# Patient Record
Sex: FEMALE | Race: WHITE | Employment: FULL TIME
[De-identification: names, ages, dates, MRNs, and addresses within clinical notes are randomized per-mention and may not be internally consistent; named-entity substitution may affect disease eponyms.]

---

## 2024-06-29 ENCOUNTER — HOSPITAL ENCOUNTER (EMERGENCY)
Facility: HOSPITAL | Age: 29
Discharge: HOME | End: 2024-06-29
Attending: EMERGENCY MEDICINE
Payer: COMMERCIAL

## 2024-06-29 ENCOUNTER — APPOINTMENT (OUTPATIENT)
Facility: HOSPITAL | Age: 29
End: 2024-06-29
Payer: COMMERCIAL

## 2024-06-29 VITALS
TEMPERATURE: 98.7 F | SYSTOLIC BLOOD PRESSURE: 101 MMHG | DIASTOLIC BLOOD PRESSURE: 61 MMHG | HEIGHT: 61 IN | OXYGEN SATURATION: 100 % | WEIGHT: 177.7 LBS | HEART RATE: 108 BPM | BODY MASS INDEX: 33.55 KG/M2 | RESPIRATION RATE: 22 BRPM

## 2024-06-29 DIAGNOSIS — N30.01 ACUTE CYSTITIS WITH HEMATURIA: Primary | ICD-10-CM

## 2024-06-29 LAB
ALBUMIN SERPL-MCNC: 3.6 G/DL (ref 3.5–5)
ALBUMIN/GLOB SERPL: 0.9 (ref 1.1–2.2)
ALP SERPL-CCNC: 72 U/L (ref 45–117)
ALT SERPL-CCNC: 24 U/L (ref 12–78)
ANION GAP SERPL CALC-SCNC: 12 MMOL/L (ref 5–15)
APPEARANCE UR: ABNORMAL
AST SERPL-CCNC: 5 U/L (ref 15–37)
BACTERIA URNS QL MICRO: ABNORMAL /HPF
BILIRUB SERPL-MCNC: 0.7 MG/DL (ref 0.2–1)
BILIRUB UR QL: NEGATIVE
BUN SERPL-MCNC: 7 MG/DL (ref 6–20)
BUN/CREAT SERPL: 6 (ref 12–20)
CALCIUM SERPL-MCNC: 9.4 MG/DL (ref 8.5–10.1)
CHLORIDE SERPL-SCNC: 101 MMOL/L (ref 97–108)
CO2 SERPL-SCNC: 21 MMOL/L (ref 21–32)
COLOR UR: ABNORMAL
COMMENT:: NORMAL
CREAT SERPL-MCNC: 1.24 MG/DL (ref 0.55–1.02)
EKG ATRIAL RATE: 107 BPM
EKG DIAGNOSIS: NORMAL
EKG P AXIS: 50 DEGREES
EKG P-R INTERVAL: 122 MS
EKG Q-T INTERVAL: 328 MS
EKG QRS DURATION: 80 MS
EKG QTC CALCULATION (BAZETT): 437 MS
EKG R AXIS: 41 DEGREES
EKG T AXIS: 39 DEGREES
EKG VENTRICULAR RATE: 107 BPM
EPITH CASTS URNS QL MICRO: ABNORMAL /LPF
ERYTHROCYTE [DISTWIDTH] IN BLOOD BY AUTOMATED COUNT: 12.7 % (ref 11.5–14.5)
GLOBULIN SER CALC-MCNC: 4.2 G/DL (ref 2–4)
GLUCOSE SERPL-MCNC: 128 MG/DL (ref 65–100)
GLUCOSE UR STRIP.AUTO-MCNC: NEGATIVE MG/DL
HCG SERPL QL: NEGATIVE
HCT VFR BLD AUTO: 35.2 % (ref 35–47)
HGB BLD-MCNC: 11.8 G/DL (ref 11.5–16)
HGB UR QL STRIP: ABNORMAL
HYALINE CASTS URNS QL MICRO: ABNORMAL /LPF (ref 0–2)
KETONES UR QL STRIP.AUTO: NEGATIVE MG/DL
LEUKOCYTE ESTERASE UR QL STRIP.AUTO: ABNORMAL
MCH RBC QN AUTO: 27.1 PG (ref 26–34)
MCHC RBC AUTO-ENTMCNC: 33.5 G/DL (ref 30–36.5)
MCV RBC AUTO: 80.9 FL (ref 80–99)
NITRITE UR QL STRIP.AUTO: NEGATIVE
NRBC # BLD: 0 K/UL (ref 0–0.01)
NRBC BLD-RTO: 0 PER 100 WBC
PH UR STRIP: 7.5 (ref 5–8)
PLATELET # BLD AUTO: 349 K/UL (ref 150–400)
PMV BLD AUTO: 9.9 FL (ref 8.9–12.9)
POTASSIUM SERPL-SCNC: 3.5 MMOL/L (ref 3.5–5.1)
PROT SERPL-MCNC: 7.8 G/DL (ref 6.4–8.2)
PROT UR STRIP-MCNC: 100 MG/DL
RBC # BLD AUTO: 4.35 M/UL (ref 3.8–5.2)
RBC #/AREA URNS HPF: ABNORMAL /HPF (ref 0–5)
SODIUM SERPL-SCNC: 134 MMOL/L (ref 136–145)
SP GR UR REFRACTOMETRY: 1.01 (ref 1–1.03)
SPECIMEN HOLD: NORMAL
TROPONIN I SERPL HS-MCNC: <4 NG/L (ref 0–51)
UROBILINOGEN UR QL STRIP.AUTO: 0.2 EU/DL (ref 0.2–1)
WBC # BLD AUTO: 17.1 K/UL (ref 3.6–11)
WBC URNS QL MICRO: >100 /HPF (ref 0–4)

## 2024-06-29 PROCEDURE — 93010 ELECTROCARDIOGRAM REPORT: CPT | Performed by: INTERNAL MEDICINE

## 2024-06-29 PROCEDURE — 85027 COMPLETE CBC AUTOMATED: CPT

## 2024-06-29 PROCEDURE — 6360000002 HC RX W HCPCS: Performed by: EMERGENCY MEDICINE

## 2024-06-29 PROCEDURE — 84484 ASSAY OF TROPONIN QUANT: CPT

## 2024-06-29 PROCEDURE — 96374 THER/PROPH/DIAG INJ IV PUSH: CPT

## 2024-06-29 PROCEDURE — 84703 CHORIONIC GONADOTROPIN ASSAY: CPT

## 2024-06-29 PROCEDURE — 81001 URINALYSIS AUTO W/SCOPE: CPT

## 2024-06-29 PROCEDURE — 80053 COMPREHEN METABOLIC PANEL: CPT

## 2024-06-29 PROCEDURE — 2580000003 HC RX 258: Performed by: EMERGENCY MEDICINE

## 2024-06-29 PROCEDURE — 71046 X-RAY EXAM CHEST 2 VIEWS: CPT

## 2024-06-29 PROCEDURE — 36415 COLL VENOUS BLD VENIPUNCTURE: CPT

## 2024-06-29 PROCEDURE — 96361 HYDRATE IV INFUSION ADD-ON: CPT

## 2024-06-29 PROCEDURE — 99285 EMERGENCY DEPT VISIT HI MDM: CPT

## 2024-06-29 PROCEDURE — 93005 ELECTROCARDIOGRAM TRACING: CPT | Performed by: EMERGENCY MEDICINE

## 2024-06-29 RX ORDER — 0.9 % SODIUM CHLORIDE 0.9 %
1000 INTRAVENOUS SOLUTION INTRAVENOUS ONCE
Status: COMPLETED | OUTPATIENT
Start: 2024-06-29 | End: 2024-06-29

## 2024-06-29 RX ORDER — NITROFURANTOIN 25; 75 MG/1; MG/1
100 CAPSULE ORAL 2 TIMES DAILY
Qty: 5 CAPSULE | Refills: 0 | Status: ON HOLD | OUTPATIENT
Start: 2024-06-29 | End: 2024-07-04 | Stop reason: HOSPADM

## 2024-06-29 RX ORDER — KETOROLAC TROMETHAMINE 30 MG/ML
30 INJECTION, SOLUTION INTRAMUSCULAR; INTRAVENOUS ONCE
Status: COMPLETED | OUTPATIENT
Start: 2024-06-29 | End: 2024-06-29

## 2024-06-29 RX ADMIN — SODIUM CHLORIDE 1000 ML: 9 INJECTION, SOLUTION INTRAVENOUS at 02:16

## 2024-06-29 RX ADMIN — SODIUM CHLORIDE 1000 ML: 9 INJECTION, SOLUTION INTRAVENOUS at 04:00

## 2024-06-29 RX ADMIN — KETOROLAC TROMETHAMINE 30 MG: 30 INJECTION, SOLUTION INTRAMUSCULAR at 04:05

## 2024-06-29 ASSESSMENT — ENCOUNTER SYMPTOMS
SHORTNESS OF BREATH: 1
NAUSEA: 0
WHEEZING: 0
ABDOMINAL PAIN: 0
COUGH: 0
VOMITING: 0

## 2024-06-29 ASSESSMENT — PAIN SCALES - GENERAL
PAINLEVEL_OUTOF10: 4
PAINLEVEL_OUTOF10: 5

## 2024-06-29 ASSESSMENT — PAIN DESCRIPTION - ORIENTATION: ORIENTATION: LOWER

## 2024-06-29 ASSESSMENT — PAIN - FUNCTIONAL ASSESSMENT
PAIN_FUNCTIONAL_ASSESSMENT: 0-10
PAIN_FUNCTIONAL_ASSESSMENT: ACTIVITIES ARE NOT PREVENTED
PAIN_FUNCTIONAL_ASSESSMENT: ACTIVITIES ARE NOT PREVENTED

## 2024-06-29 ASSESSMENT — PAIN DESCRIPTION - DESCRIPTORS
DESCRIPTORS: ACHING
DESCRIPTORS: ACHING

## 2024-06-29 ASSESSMENT — PAIN DESCRIPTION - PAIN TYPE: TYPE: ACUTE PAIN

## 2024-06-29 ASSESSMENT — PAIN DESCRIPTION - LOCATION
LOCATION: OTHER (COMMENT)
LOCATION: BACK

## 2024-06-29 NOTE — ED PROVIDER NOTES
[facility] EMERGENCY DEPT  EMERGENCY DEPARTMENT ENCOUNTER      Pt Name: Rosey Hardy  MRN: [id number]  Birthdate 1995  Date of evaluation: 6/29/2024  Provider: Sara Joe MD    CHIEF COMPLAINT       Chief Complaint   Patient presents with    Shortness of Breath         HISTORY OF PRESENT ILLNESS    HPI    Rosey Hardy is a 29 y.o. female with PMH significant for depression/anxiety who presents to the emergency department of shortness of breath with lightheadedness starting approximately 3 hours ago.  Patient also reports increased urinary frequency without dysuria starting today.  Denies fever, chills, nausea, vomiting, diarrhea, constipation.  Denies chest pain, headache.  Denies pregnancy.  Denies history of PE/DVT.  Denies leg swelling or asymmetry.  Denies any recent surgery or immobilization.    Nursing Notes were reviewed.    REVIEW OF SYSTEMS       Review of Systems   Constitutional:  Negative for chills and fever.   Respiratory:  Positive for shortness of breath. Negative for cough and wheezing.    Cardiovascular:  Negative for chest pain.   Gastrointestinal:  Negative for abdominal pain, nausea and vomiting.   Genitourinary:  Negative for difficulty urinating and flank pain.   Musculoskeletal:  Positive for myalgias.   Skin:  Negative for wound.   Neurological:  Positive for light-headedness. Negative for headaches.   Psychiatric/Behavioral:  Negative for suicidal ideas.            PAST MEDICAL HISTORY   No past medical history on file.      SURGICAL HISTORY     No past surgical history on file.      CURRENT MEDICATIONS       Previous Medications    No medications on file       ALLERGIES     Patient has no known allergies.    FAMILY HISTORY     No family history on file.       SOCIAL HISTORY       Social History     Socioeconomic History    Marital status:        SCREENINGS   NIH Stroke Scale  Interval: Baseline  Level of Consciousness (1a): Alert  LOC Questions (1b): Answers both

## 2024-06-29 NOTE — ED TRIAGE NOTES
Patient arrives ambulatory with a complain of shortness of breath started few hours ago.    Patient reports when she woke up few hours ago she started feeling unwell, dizziness and has trouble in breathing.    Patient states she is peeing every 5 minutes with pain at home and [facility].    Denies any chest pain, headache, nausea, vomiting and fever.

## 2024-06-30 ENCOUNTER — APPOINTMENT (OUTPATIENT)
Facility: HOSPITAL | Age: 29
End: 2024-06-30
Payer: COMMERCIAL

## 2024-06-30 ENCOUNTER — HOSPITAL ENCOUNTER (INPATIENT)
Facility: HOSPITAL | Age: 29
LOS: 3 days | Discharge: HOME | End: 2024-07-04
Attending: STUDENT IN AN ORGANIZED HEALTH CARE EDUCATION/TRAINING PROGRAM | Admitting: INTERNAL MEDICINE
Payer: COMMERCIAL

## 2024-06-30 DIAGNOSIS — A41.9 SEPTICEMIA (HCC): ICD-10-CM

## 2024-06-30 DIAGNOSIS — N12 PYELONEPHRITIS: Primary | ICD-10-CM

## 2024-06-30 LAB
ALBUMIN SERPL-MCNC: 2.7 G/DL (ref 3.5–5)
ALBUMIN/GLOB SERPL: 0.6 (ref 1.1–2.2)
ALP SERPL-CCNC: 76 U/L (ref 45–117)
ALT SERPL-CCNC: 19 U/L (ref 12–78)
ANION GAP SERPL CALC-SCNC: 10 MMOL/L (ref 5–15)
AST SERPL-CCNC: 9 U/L (ref 15–37)
BASOPHILS # BLD: 0 K/UL (ref 0–0.1)
BASOPHILS NFR BLD: 0 % (ref 0–1)
BILIRUB SERPL-MCNC: 0.4 MG/DL (ref 0.2–1)
BUN SERPL-MCNC: 9 MG/DL (ref 6–20)
BUN/CREAT SERPL: 7 (ref 12–20)
CALCIUM SERPL-MCNC: 8.8 MG/DL (ref 8.5–10.1)
CHLORIDE SERPL-SCNC: 99 MMOL/L (ref 97–108)
CO2 SERPL-SCNC: 22 MMOL/L (ref 21–32)
CREAT SERPL-MCNC: 1.27 MG/DL (ref 0.55–1.02)
DIFFERENTIAL METHOD BLD: ABNORMAL
EOSINOPHIL # BLD: 0 K/UL (ref 0–0.4)
EOSINOPHIL NFR BLD: 0 % (ref 0–7)
ERYTHROCYTE [DISTWIDTH] IN BLOOD BY AUTOMATED COUNT: 13.1 % (ref 11.5–14.5)
GLOBULIN SER CALC-MCNC: 4.2 G/DL (ref 2–4)
GLUCOSE SERPL-MCNC: 109 MG/DL (ref 65–100)
HCT VFR BLD AUTO: 31 % (ref 35–47)
HGB BLD-MCNC: 10.6 G/DL (ref 11.5–16)
IMM GRANULOCYTES # BLD AUTO: 0 K/UL (ref 0–0.04)
IMM GRANULOCYTES NFR BLD AUTO: 0 % (ref 0–0.5)
LYMPHOCYTES # BLD: 0.8 K/UL (ref 0.8–3.5)
LYMPHOCYTES NFR BLD: 4 % (ref 12–49)
MCH RBC QN AUTO: 27.1 PG (ref 26–34)
MCHC RBC AUTO-ENTMCNC: 34.2 G/DL (ref 30–36.5)
MCV RBC AUTO: 79.3 FL (ref 80–99)
MONOCYTES # BLD: 1.4 K/UL (ref 0–1)
MONOCYTES NFR BLD: 7 % (ref 5–13)
NEUTS SEG # BLD: 17.3 K/UL (ref 1.8–8)
NEUTS SEG NFR BLD: 89 % (ref 32–75)
NRBC # BLD: 0 K/UL (ref 0–0.01)
NRBC BLD-RTO: 0 PER 100 WBC
PLATELET # BLD AUTO: 306 K/UL (ref 150–400)
PMV BLD AUTO: 10.3 FL (ref 8.9–12.9)
POTASSIUM SERPL-SCNC: 3.2 MMOL/L (ref 3.5–5.1)
PROT SERPL-MCNC: 6.9 G/DL (ref 6.4–8.2)
RBC # BLD AUTO: 3.91 M/UL (ref 3.8–5.2)
RBC MORPH BLD: ABNORMAL
SODIUM SERPL-SCNC: 131 MMOL/L (ref 136–145)
SPECIMEN HOLD: NORMAL
WBC # BLD AUTO: 19.5 K/UL (ref 3.6–11)

## 2024-06-30 PROCEDURE — 87086 URINE CULTURE/COLONY COUNT: CPT

## 2024-06-30 PROCEDURE — 99285 EMERGENCY DEPT VISIT HI MDM: CPT

## 2024-06-30 PROCEDURE — 2580000003 HC RX 258: Performed by: STUDENT IN AN ORGANIZED HEALTH CARE EDUCATION/TRAINING PROGRAM

## 2024-06-30 PROCEDURE — 83605 ASSAY OF LACTIC ACID: CPT

## 2024-06-30 PROCEDURE — 81001 URINALYSIS AUTO W/SCOPE: CPT

## 2024-06-30 PROCEDURE — 87040 BLOOD CULTURE FOR BACTERIA: CPT

## 2024-06-30 PROCEDURE — 36415 COLL VENOUS BLD VENIPUNCTURE: CPT

## 2024-06-30 PROCEDURE — 96361 HYDRATE IV INFUSION ADD-ON: CPT

## 2024-06-30 PROCEDURE — 85025 COMPLETE CBC W/AUTO DIFF WBC: CPT

## 2024-06-30 PROCEDURE — 96375 TX/PRO/DX INJ NEW DRUG ADDON: CPT

## 2024-06-30 PROCEDURE — 6360000002 HC RX W HCPCS: Performed by: STUDENT IN AN ORGANIZED HEALTH CARE EDUCATION/TRAINING PROGRAM

## 2024-06-30 PROCEDURE — 74177 CT ABD & PELVIS W/CONTRAST: CPT

## 2024-06-30 PROCEDURE — 80053 COMPREHEN METABOLIC PANEL: CPT

## 2024-06-30 PROCEDURE — 96374 THER/PROPH/DIAG INJ IV PUSH: CPT

## 2024-06-30 PROCEDURE — 93005 ELECTROCARDIOGRAM TRACING: CPT | Performed by: INTERNAL MEDICINE

## 2024-06-30 PROCEDURE — 6360000004 HC RX CONTRAST MEDICATION: Performed by: STUDENT IN AN ORGANIZED HEALTH CARE EDUCATION/TRAINING PROGRAM

## 2024-06-30 RX ORDER — ONDANSETRON 2 MG/ML
4 INJECTION INTRAMUSCULAR; INTRAVENOUS ONCE
Status: COMPLETED | OUTPATIENT
Start: 2024-06-30 | End: 2024-06-30

## 2024-06-30 RX ORDER — 0.9 % SODIUM CHLORIDE 0.9 %
1000 INTRAVENOUS SOLUTION INTRAVENOUS ONCE
Status: COMPLETED | OUTPATIENT
Start: 2024-06-30 | End: 2024-07-01

## 2024-06-30 RX ORDER — KETOROLAC TROMETHAMINE 15 MG/ML
15 INJECTION, SOLUTION INTRAMUSCULAR; INTRAVENOUS ONCE
Status: COMPLETED | OUTPATIENT
Start: 2024-06-30 | End: 2024-06-30

## 2024-06-30 RX ORDER — MORPHINE SULFATE 4 MG/ML
4 INJECTION, SOLUTION INTRAMUSCULAR; INTRAVENOUS
Status: ACTIVE | OUTPATIENT
Start: 2024-06-30 | End: 2024-07-01

## 2024-06-30 RX ADMIN — IOPAMIDOL 100 ML: 755 INJECTION, SOLUTION INTRAVENOUS at 22:51

## 2024-06-30 RX ADMIN — KETOROLAC TROMETHAMINE 15 MG: 15 INJECTION, SOLUTION INTRAMUSCULAR; INTRAVENOUS at 23:23

## 2024-06-30 RX ADMIN — ONDANSETRON 4 MG: 2 INJECTION INTRAMUSCULAR; INTRAVENOUS at 23:23

## 2024-06-30 RX ADMIN — SODIUM CHLORIDE 1000 ML: 9 INJECTION, SOLUTION INTRAVENOUS at 23:23

## 2024-06-30 ASSESSMENT — PAIN - FUNCTIONAL ASSESSMENT: PAIN_FUNCTIONAL_ASSESSMENT: 0-10

## 2024-06-30 ASSESSMENT — PAIN SCALES - GENERAL: PAINLEVEL_OUTOF10: 4

## 2024-07-01 PROBLEM — N10 ACUTE PYELONEPHRITIS: Status: ACTIVE | Noted: 2024-07-01

## 2024-07-01 PROBLEM — E87.1 HYPONATREMIA: Status: ACTIVE | Noted: 2024-07-01

## 2024-07-01 PROBLEM — F41.8 ANXIETY WITH DEPRESSION: Status: ACTIVE | Noted: 2024-07-01

## 2024-07-01 PROBLEM — A41.9 SEPSIS (HCC): Status: ACTIVE | Noted: 2024-07-01

## 2024-07-01 PROBLEM — E87.6 HYPOKALEMIA: Status: ACTIVE | Noted: 2024-07-01

## 2024-07-01 LAB
ANION GAP SERPL CALC-SCNC: 6 MMOL/L (ref 5–15)
APPEARANCE UR: CLEAR
BACTERIA URNS QL MICRO: ABNORMAL /HPF
BILIRUB UR QL: NEGATIVE
BUN SERPL-MCNC: 8 MG/DL (ref 6–20)
BUN/CREAT SERPL: 7 (ref 12–20)
CALCIUM SERPL-MCNC: 8.2 MG/DL (ref 8.5–10.1)
CHLORIDE SERPL-SCNC: 107 MMOL/L (ref 97–108)
CO2 SERPL-SCNC: 22 MMOL/L (ref 21–32)
COLOR UR: ABNORMAL
COMMENT:: NORMAL
CREAT SERPL-MCNC: 1.21 MG/DL (ref 0.55–1.02)
EKG ATRIAL RATE: 111 BPM
EKG DIAGNOSIS: NORMAL
EKG P AXIS: 44 DEGREES
EKG P-R INTERVAL: 118 MS
EKG Q-T INTERVAL: 368 MS
EKG QRS DURATION: 80 MS
EKG QTC CALCULATION (BAZETT): 500 MS
EKG R AXIS: 14 DEGREES
EKG T AXIS: 23 DEGREES
EKG VENTRICULAR RATE: 111 BPM
EPITH CASTS URNS QL MICRO: ABNORMAL /LPF
GLUCOSE SERPL-MCNC: 100 MG/DL (ref 65–100)
GLUCOSE UR STRIP.AUTO-MCNC: NEGATIVE MG/DL
HGB UR QL STRIP: NEGATIVE
HYALINE CASTS URNS QL MICRO: ABNORMAL /LPF (ref 0–5)
KETONES UR QL STRIP.AUTO: NEGATIVE MG/DL
LACTATE SERPL-SCNC: 0.7 MMOL/L (ref 0.4–2)
LACTATE SERPL-SCNC: 0.9 MMOL/L (ref 0.4–2)
LEUKOCYTE ESTERASE UR QL STRIP.AUTO: ABNORMAL
MAGNESIUM SERPL-MCNC: 2.1 MG/DL (ref 1.6–2.4)
NITRITE UR QL STRIP.AUTO: NEGATIVE
PH UR STRIP: 6.5 (ref 5–8)
POTASSIUM SERPL-SCNC: 3.3 MMOL/L (ref 3.5–5.1)
PROT UR STRIP-MCNC: ABNORMAL MG/DL
RBC #/AREA URNS HPF: ABNORMAL /HPF (ref 0–5)
SODIUM SERPL-SCNC: 135 MMOL/L (ref 136–145)
SP GR UR REFRACTOMETRY: 1.02 (ref 1–1.03)
SPECIMEN HOLD: NORMAL
UROBILINOGEN UR QL STRIP.AUTO: 0.2 EU/DL (ref 0.2–1)
WBC URNS QL MICRO: ABNORMAL /HPF (ref 0–4)

## 2024-07-01 PROCEDURE — 6360000002 HC RX W HCPCS: Performed by: STUDENT IN AN ORGANIZED HEALTH CARE EDUCATION/TRAINING PROGRAM

## 2024-07-01 PROCEDURE — 2580000003 HC RX 258: Performed by: INTERNAL MEDICINE

## 2024-07-01 PROCEDURE — 6370000000 HC RX 637 (ALT 250 FOR IP): Performed by: INTERNAL MEDICINE

## 2024-07-01 PROCEDURE — 6360000002 HC RX W HCPCS: Performed by: INTERNAL MEDICINE

## 2024-07-01 PROCEDURE — 96375 TX/PRO/DX INJ NEW DRUG ADDON: CPT

## 2024-07-01 PROCEDURE — 2580000003 HC RX 258: Performed by: STUDENT IN AN ORGANIZED HEALTH CARE EDUCATION/TRAINING PROGRAM

## 2024-07-01 PROCEDURE — 83735 ASSAY OF MAGNESIUM: CPT

## 2024-07-01 PROCEDURE — 94761 N-INVAS EAR/PLS OXIMETRY MLT: CPT

## 2024-07-01 PROCEDURE — 36415 COLL VENOUS BLD VENIPUNCTURE: CPT

## 2024-07-01 PROCEDURE — 83605 ASSAY OF LACTIC ACID: CPT

## 2024-07-01 PROCEDURE — 1100000000 HC RM PRIVATE

## 2024-07-01 PROCEDURE — 80048 BASIC METABOLIC PNL TOTAL CA: CPT

## 2024-07-01 RX ORDER — SODIUM CHLORIDE 9 MG/ML
INJECTION, SOLUTION INTRAVENOUS CONTINUOUS
Status: DISCONTINUED | OUTPATIENT
Start: 2024-07-01 | End: 2024-07-01

## 2024-07-01 RX ORDER — DEXTROAMPHETAMINE SACCHARATE, AMPHETAMINE ASPARTATE, DEXTROAMPHETAMINE SULFATE AND AMPHETAMINE SULFATE 7.5; 7.5; 7.5; 7.5 MG/1; MG/1; MG/1; MG/1
60 TABLET ORAL PRN
COMMUNITY

## 2024-07-01 RX ORDER — FLUOXETINE HYDROCHLORIDE 20 MG/1
40 CAPSULE ORAL NIGHTLY
Status: DISCONTINUED | OUTPATIENT
Start: 2024-07-01 | End: 2024-07-04 | Stop reason: HOSPADM

## 2024-07-01 RX ORDER — BUPROPION HYDROCHLORIDE 150 MG/1
450 TABLET ORAL NIGHTLY
Status: DISCONTINUED | OUTPATIENT
Start: 2024-07-01 | End: 2024-07-04 | Stop reason: HOSPADM

## 2024-07-01 RX ORDER — SODIUM CHLORIDE AND POTASSIUM CHLORIDE 300; 900 MG/100ML; MG/100ML
INJECTION, SOLUTION INTRAVENOUS CONTINUOUS
Status: DISCONTINUED | OUTPATIENT
Start: 2024-07-01 | End: 2024-07-02

## 2024-07-01 RX ORDER — ONDANSETRON 4 MG/1
4 TABLET, ORALLY DISINTEGRATING ORAL EVERY 8 HOURS PRN
Status: DISCONTINUED | OUTPATIENT
Start: 2024-07-01 | End: 2024-07-04 | Stop reason: HOSPADM

## 2024-07-01 RX ORDER — ENOXAPARIN SODIUM 100 MG/ML
40 INJECTION SUBCUTANEOUS DAILY
Status: DISCONTINUED | OUTPATIENT
Start: 2024-07-01 | End: 2024-07-04 | Stop reason: HOSPADM

## 2024-07-01 RX ORDER — POLYETHYLENE GLYCOL 3350 17 G/17G
17 POWDER, FOR SOLUTION ORAL DAILY PRN
Status: DISCONTINUED | OUTPATIENT
Start: 2024-07-01 | End: 2024-07-04 | Stop reason: HOSPADM

## 2024-07-01 RX ORDER — ARIPIPRAZOLE 5 MG/1
2.5 TABLET ORAL EVERY MORNING
Status: DISCONTINUED | OUTPATIENT
Start: 2024-07-01 | End: 2024-07-04 | Stop reason: HOSPADM

## 2024-07-01 RX ORDER — POTASSIUM CHLORIDE 750 MG/1
40 TABLET, FILM COATED, EXTENDED RELEASE ORAL ONCE
Status: DISCONTINUED | OUTPATIENT
Start: 2024-07-01 | End: 2024-07-01

## 2024-07-01 RX ORDER — MAGNESIUM SULFATE IN WATER 40 MG/ML
2000 INJECTION, SOLUTION INTRAVENOUS PRN
Status: DISCONTINUED | OUTPATIENT
Start: 2024-07-01 | End: 2024-07-04 | Stop reason: HOSPADM

## 2024-07-01 RX ORDER — ACETAMINOPHEN 650 MG/1
650 SUPPOSITORY RECTAL EVERY 6 HOURS PRN
Status: DISCONTINUED | OUTPATIENT
Start: 2024-07-01 | End: 2024-07-04 | Stop reason: HOSPADM

## 2024-07-01 RX ORDER — SODIUM CHLORIDE 0.9 % (FLUSH) 0.9 %
5-40 SYRINGE (ML) INJECTION PRN
Status: DISCONTINUED | OUTPATIENT
Start: 2024-07-01 | End: 2024-07-04 | Stop reason: HOSPADM

## 2024-07-01 RX ORDER — DEXTROAMPHETAMINE SACCHARATE, AMPHETAMINE ASPARTATE MONOHYDRATE, DEXTROAMPHETAMINE SULFATE AND AMPHETAMINE SULFATE 7.5; 7.5; 7.5; 7.5 MG/1; MG/1; MG/1; MG/1
60 CAPSULE, EXTENDED RELEASE ORAL PRN
COMMUNITY
Start: 2024-06-20

## 2024-07-01 RX ORDER — LITHIUM CARBONATE 300 MG/1
300 CAPSULE ORAL NIGHTLY
COMMUNITY
Start: 2024-03-25

## 2024-07-01 RX ORDER — HYDROMORPHONE HYDROCHLORIDE 1 MG/ML
1 INJECTION, SOLUTION INTRAMUSCULAR; INTRAVENOUS; SUBCUTANEOUS EVERY 4 HOURS PRN
Status: DISCONTINUED | OUTPATIENT
Start: 2024-07-01 | End: 2024-07-01

## 2024-07-01 RX ORDER — MORPHINE SULFATE 4 MG/ML
4 INJECTION, SOLUTION INTRAMUSCULAR; INTRAVENOUS EVERY 4 HOURS PRN
Status: DISCONTINUED | OUTPATIENT
Start: 2024-07-01 | End: 2024-07-02

## 2024-07-01 RX ORDER — LITHIUM CARBONATE 300 MG/1
300 CAPSULE ORAL NIGHTLY
Status: DISCONTINUED | OUTPATIENT
Start: 2024-07-01 | End: 2024-07-04 | Stop reason: HOSPADM

## 2024-07-01 RX ORDER — POTASSIUM CHLORIDE 750 MG/1
20 TABLET, FILM COATED, EXTENDED RELEASE ORAL ONCE
Status: COMPLETED | OUTPATIENT
Start: 2024-07-01 | End: 2024-07-01

## 2024-07-01 RX ORDER — BUPROPION HYDROCHLORIDE 150 MG/1
450 TABLET ORAL NIGHTLY
COMMUNITY

## 2024-07-01 RX ORDER — SODIUM CHLORIDE 9 MG/ML
INJECTION, SOLUTION INTRAVENOUS PRN
Status: DISCONTINUED | OUTPATIENT
Start: 2024-07-01 | End: 2024-07-04 | Stop reason: HOSPADM

## 2024-07-01 RX ORDER — ACETAMINOPHEN 325 MG/1
650 TABLET ORAL EVERY 6 HOURS PRN
Status: DISCONTINUED | OUTPATIENT
Start: 2024-07-01 | End: 2024-07-04 | Stop reason: HOSPADM

## 2024-07-01 RX ORDER — SODIUM CHLORIDE 0.9 % (FLUSH) 0.9 %
5-40 SYRINGE (ML) INJECTION EVERY 12 HOURS SCHEDULED
Status: DISCONTINUED | OUTPATIENT
Start: 2024-07-01 | End: 2024-07-04 | Stop reason: HOSPADM

## 2024-07-01 RX ORDER — ONDANSETRON 2 MG/ML
4 INJECTION INTRAMUSCULAR; INTRAVENOUS EVERY 6 HOURS PRN
Status: DISCONTINUED | OUTPATIENT
Start: 2024-07-01 | End: 2024-07-04 | Stop reason: HOSPADM

## 2024-07-01 RX ORDER — FLUOXETINE HYDROCHLORIDE 40 MG/1
40 CAPSULE ORAL NIGHTLY
COMMUNITY

## 2024-07-01 RX ORDER — OXYCODONE HYDROCHLORIDE 5 MG/1
5 TABLET ORAL EVERY 6 HOURS PRN
Status: DISCONTINUED | OUTPATIENT
Start: 2024-07-01 | End: 2024-07-02

## 2024-07-01 RX ORDER — ARIPIPRAZOLE 5 MG/1
2.5 TABLET ORAL EVERY MORNING
COMMUNITY

## 2024-07-01 RX ADMIN — LITHIUM CARBONATE 300 MG: 300 CAPSULE, GELATIN COATED ORAL at 21:02

## 2024-07-01 RX ADMIN — LITHIUM CARBONATE 300 MG: 300 CAPSULE, GELATIN COATED ORAL at 04:47

## 2024-07-01 RX ADMIN — MORPHINE SULFATE 4 MG: 4 INJECTION, SOLUTION INTRAMUSCULAR; INTRAVENOUS at 22:32

## 2024-07-01 RX ADMIN — POTASSIUM CHLORIDE 20 MEQ: 750 TABLET, FILM COATED, EXTENDED RELEASE ORAL at 16:28

## 2024-07-01 RX ADMIN — HYDROMORPHONE HYDROCHLORIDE 1 MG: 1 INJECTION, SOLUTION INTRAMUSCULAR; INTRAVENOUS; SUBCUTANEOUS at 14:35

## 2024-07-01 RX ADMIN — ARIPIPRAZOLE 2.5 MG: 5 TABLET ORAL at 08:01

## 2024-07-01 RX ADMIN — HYDROMORPHONE HYDROCHLORIDE 1 MG: 1 INJECTION, SOLUTION INTRAMUSCULAR; INTRAVENOUS; SUBCUTANEOUS at 09:53

## 2024-07-01 RX ADMIN — SODIUM CHLORIDE, PRESERVATIVE FREE 10 ML: 5 INJECTION INTRAVENOUS at 08:05

## 2024-07-01 RX ADMIN — ENOXAPARIN SODIUM 40 MG: 100 INJECTION SUBCUTANEOUS at 12:01

## 2024-07-01 RX ADMIN — WATER 1000 MG: 1 INJECTION INTRAMUSCULAR; INTRAVENOUS; SUBCUTANEOUS at 02:26

## 2024-07-01 RX ADMIN — POTASSIUM CHLORIDE AND SODIUM CHLORIDE: 900; 300 INJECTION, SOLUTION INTRAVENOUS at 01:47

## 2024-07-01 RX ADMIN — SODIUM CHLORIDE, PRESERVATIVE FREE 10 ML: 5 INJECTION INTRAVENOUS at 21:49

## 2024-07-01 RX ADMIN — BUPROPION HYDROCHLORIDE 450 MG: 150 TABLET, EXTENDED RELEASE ORAL at 21:02

## 2024-07-01 RX ADMIN — WATER 2000 MG: 1 INJECTION INTRAMUSCULAR; INTRAVENOUS; SUBCUTANEOUS at 21:02

## 2024-07-01 RX ADMIN — BUPROPION HYDROCHLORIDE 450 MG: 150 TABLET, EXTENDED RELEASE ORAL at 04:47

## 2024-07-01 RX ADMIN — POTASSIUM CHLORIDE AND SODIUM CHLORIDE: 900; 300 INJECTION, SOLUTION INTRAVENOUS at 09:58

## 2024-07-01 RX ADMIN — WATER 1000 MG: 1 INJECTION INTRAMUSCULAR; INTRAVENOUS; SUBCUTANEOUS at 00:14

## 2024-07-01 RX ADMIN — OXYCODONE HYDROCHLORIDE 5 MG: 5 TABLET ORAL at 21:03

## 2024-07-01 RX ADMIN — FLUOXETINE HYDROCHLORIDE 40 MG: 20 CAPSULE ORAL at 04:47

## 2024-07-01 RX ADMIN — POTASSIUM CHLORIDE AND SODIUM CHLORIDE: 900; 300 INJECTION, SOLUTION INTRAVENOUS at 17:58

## 2024-07-01 RX ADMIN — FLUOXETINE HYDROCHLORIDE 40 MG: 20 CAPSULE ORAL at 21:49

## 2024-07-01 ASSESSMENT — PAIN SCALES - GENERAL
PAINLEVEL_OUTOF10: 5
PAINLEVEL_OUTOF10: 7
PAINLEVEL_OUTOF10: 7
PAINLEVEL_OUTOF10: 8
PAINLEVEL_OUTOF10: 5
PAINLEVEL_OUTOF10: 7
PAINLEVEL_OUTOF10: 3
PAINLEVEL_OUTOF10: 6

## 2024-07-01 ASSESSMENT — PAIN DESCRIPTION - LOCATION
LOCATION: GENERALIZED
LOCATION: ABDOMEN;PELVIS
LOCATION: HEAD;LEG

## 2024-07-01 ASSESSMENT — PAIN DESCRIPTION - ORIENTATION: ORIENTATION: RIGHT;LEFT

## 2024-07-01 ASSESSMENT — PAIN DESCRIPTION - DESCRIPTORS
DESCRIPTORS: ACHING;SORE
DESCRIPTORS: ACHING

## 2024-07-01 NOTE — CARE COORDINATION
7/1/2024  10:33 AM      ICD-10-CM    1. Pyelonephritis  N12       2. Septicemia (HCC)  A41.9             General Risk Score: 4   Values used to calculate this score:    Points  Metrics       0        Age: 29       1        Hospital Admissions: 1       1        ED Visits: 1       0        Has Chronic Obstructive Pulmonary Disease: No       0        Has Diabetes: No       0        Has Congestive Heart Failure: No       0        Has Liver Disease: No       1        Has Depression: Yes       1        Current PCP: Not on file       0        Has Medicaid: No      CM reviewed EMR. No CM needs indicated at this time. Per attending in IDRs, pt has no CM needs. Providers, if needs arise, please consult case management.        07/01/24 1033   Discharge Planning   Patient expects to be discharged to: House   Services At/After Discharge   Services At/After Discharge None   Mode of Transport at Discharge Other (see comment)   Confirm Follow Up Transport Family

## 2024-07-01 NOTE — ED TRIAGE NOTES
Flank pain since Friday. Diagnosed with UTI. Patient was given one antibiotic but brother who is a physician changed it to Cipro. Patient is now having body aches and low back pain. Patient hyperventilating in triage

## 2024-07-01 NOTE — PROGRESS NOTES
GALDINO J[facility]NS[facility]  [address] [place] [address] [phone]      Medical Progress Note      NAME: Rosey Hardy   :  1995  MRM:  [id number]    Date/Time of service: 2024         Subjective:     Chief Complaint:  Patient was personally seen and examined by me during this time period.  Chart reviewed.  FU se[sis. Improved myalgias. No current abd pain or flank pain. Some chills         Objective:       Vitals:       Last 24hrs VS reviewed since prior progress note. Most recent are:    Vitals:    24 0355   BP: 97/66   Pulse: 80   Resp: 16   Temp: 97.7 °F (36.5 °C)   SpO2: 100%     SpO2 Readings from Last 6 Encounters:   24 100%   24 100%        No intake or output data in the 24 hours ending 24 0808     Exam:     Physical Exam:    Gen:  Well-developed, well-nourished, in no acute distress  HEENT:  Pink conjunctivae, PERRL, hearing intact to voice  Resp:  No accessory muscle use, clear breath sounds without wheezes rales or rhonchi  Card:  RRR, No murmurs, normal S1, S2, no peripheral edema  Abd:  Soft, non-tender, non-distended, normoactive bowel sounds are present  Musc:  No cyanosis or clubbing  Skin:  No rashes or ulcers, skin turgor is good  Neuro:  Cranial nerves 3-12 are grossly intact, follows commands appropriately  Psych:  Oriented to person, place, and time, Alert with good insight      Medications Reviewed: (see below)    Lab Data Reviewed: (see below)    ______________________________________________________________________    Medications:     Current Facility-Administered Medications   Medication Dose Route Frequency    cefTRIAXone (ROCEPHIN) 2,000 mg in sterile water 20 mL IV syringe  2,000 mg IntraVENous Q24H    sodium chloride flush 0.9 % injection 5-40 mL  5-40 mL IntraVENous 2 times per day    sodium chloride flush 0.9 % injection 5-40 mL  5-40 mL IntraVENous PRN    0.9 % sodium chloride infusion   IntraVENous PRN     No pertinent family history in first degree relatives

## 2024-07-01 NOTE — H&P
[facility]  [address] [place]  [address]  [phone]    Hospital Medicine History and Physical      NAME:       Rosey Hardy   :       1995   MRN:      [id number]     Date of service:   2024     Chief  Complaint:  Generalized body aches, back pain     History Of Presenting Illness:       Ms. Hardy is a 29 y.o. female who is being admitted for acute bilateral pyelonephritis. Ms. Hardy presented to our Emergency Department today complaining of generalized body aches, fever and chills associated with bilateral flank and back discomfort. She was seen at our ED 24 and found to have a UTI. No cultures were done. She was started on Macrobid but her brother who is a physician apparently changed it to Ciprofloxacin. She now comes to hospital with worsening of her symptoms. In the ED, a CT scan abdomen and pelvis done showed Bilateral kidney acute pyelonephritis, left worse than right, and bilateral ureteritis.No hydronephrosis. She will be admitted for further management.     No Known Allergies    Prior to Admission medications    Medication Sig Start Date End Date Taking? Authorizing Provider   nitrofurantoin, macrocrystal-monohydrate, (MACROBID) 100 MG capsule Take 1 capsule by mouth 2 times daily for 3 days 24  Sara Joe MD       No past medical history on file.     No past surgical history on file.    Social History     Tobacco Use    Smoking status: Not on file    Smokeless tobacco: Not on file   Substance Use Topics    Alcohol use: Not on file        No family history on file. Neg     Review of Systems:    Constitutional ROS: + fever, + chills, + rigors and some night sweats  Respiratory ROS: no cough, sputum, hemoptysis, dyspnea or pleuritic pain.  Cardiovascular ROS: no chest pain, palpitations, orthopnea, PND or  PRN severe pain. Follow cultures    Hypokalemia / Hyponatremia POA: likely from poor intake. Replenish with IV fluids and follow labs    Anxiety with depression POA: verify and resume her home medications - Prozac, Bupropion, Abilify and Adderall    Code Status:  Full    Surrogate decision maker: Family    Certification: Given the high risk of decompensation, this patient requires a two midnight stay for further management     Risk of deterioration: high                   Care Plan discussed with: Patient, Family, Nursing Staff and ED physician    Prophylaxis:  SCD's    Probable Disposition:  Home w/Family    PCP:      No primary care provider on file.          ___________________________________________________    Attending Physician: Yomi Moore MD

## 2024-07-01 NOTE — ED PROVIDER NOTES
[facility] EMERGENCY DEPT  EMERGENCY DEPARTMENT ENCOUNTER      Pt Name: Rosey Hardy  MRN: [id number]  Birthdate 1995  Date of evaluation: 6/30/2024  Provider: Pratima Sullivan DO    CHIEF COMPLAINT       Chief Complaint   Patient presents with    Urinary Frequency         HISTORY OF PRESENT ILLNESS    HPI    Rosey Hardy is a 29 y.o. female with a history of depression and anxiety who presents to the emergency department for evaluation of fever, chills and bodyaches.  Patient reports urinary frequency and dysuria since Friday.  She was seen in the early morning of 6/29 and diagnosed with urinary tract infection.  She was started on Macrobid but then developed fevers therefore her brother who is a physician changed her to ciprofloxacin.  Despite antibiotic therapy she is not having any improvement of her urinary symptoms.  Temperature today 102.8, did take Tylenol 3 times today.  No other antibiotics or pain medications today.  Endorses diffuse bodyaches and low back pain, no unilateral flank pain.  No abdominal pain.  Endorses some nausea and vomiting.  No diarrhea.    Nursing Notes were reviewed.    REVIEW OF SYSTEMS       Review of Systems   Constitutional:  Positive for chills and fever.   HENT:  Negative for congestion and rhinorrhea.    Eyes:  Negative for discharge and redness.   Respiratory:  Negative for cough and shortness of breath.    Cardiovascular:  Negative for chest pain.   Gastrointestinal:  Positive for nausea and vomiting. Negative for abdominal pain and diarrhea.   Genitourinary:  Positive for dysuria and frequency.   Musculoskeletal:  Positive for myalgias.   Neurological:  Negative for speech difficulty.   Psychiatric/Behavioral:  Negative for agitation.            PAST MEDICAL HISTORY   No past medical history on file.      SURGICAL HISTORY     No past surgical history on file.      CURRENT MEDICATIONS       Previous Medications    NITROFURANTOIN, MACROCRYSTAL-MONOHYDRATE,    Procedures      FINAL IMPRESSION      1. Pyelonephritis    2. Septicemia (HCC)          DISPOSITION/PLAN   DISPOSITION Decision To Admit 07/01/2024 12:08:47 AM        (Please note that portions of this note were completed with a voice recognition program.  Efforts were made to edit the dictations but occasionally words are mis-transcribed.)    Pratima Sullivan DO (electronically signed)  Emergency Attending Physician / Physician Assistant / Nurse Practitioner         Pratima Sullivan DO  07/01/24 0124

## 2024-07-01 NOTE — PROGRESS NOTES
Ultrasound IV by Marlene Sands RN :  Procedure Note    Ultrasound IV education provided to patient. Opportunities for questions given.     Ultrasound used for PIV placement:  20 gauge 1.25 cm Difusiks  Right forearm location.  1 X Attempt(s).  Labs drawn    Vigorous blood return present and saline flushes with ease.     Procedure tolerated well. Primary RN aware of IV placement and added to LDA.      Marlene Sands RN

## 2024-07-02 LAB
ALBUMIN SERPL-MCNC: 2.5 G/DL (ref 3.5–5)
ALBUMIN/GLOB SERPL: 0.7 (ref 1.1–2.2)
ALP SERPL-CCNC: 66 U/L (ref 45–117)
ALT SERPL-CCNC: 15 U/L (ref 12–78)
ANION GAP SERPL CALC-SCNC: 9 MMOL/L (ref 5–15)
AST SERPL-CCNC: 5 U/L (ref 15–37)
BACTERIA SPEC CULT: NORMAL
BASOPHILS # BLD: 0.1 K/UL (ref 0–0.1)
BASOPHILS NFR BLD: 0 % (ref 0–1)
BILIRUB SERPL-MCNC: 0.2 MG/DL (ref 0.2–1)
BUN SERPL-MCNC: 5 MG/DL (ref 6–20)
BUN/CREAT SERPL: 6 (ref 12–20)
CALCIUM SERPL-MCNC: 8.9 MG/DL (ref 8.5–10.1)
CHLORIDE SERPL-SCNC: 110 MMOL/L (ref 97–108)
CO2 SERPL-SCNC: 17 MMOL/L (ref 21–32)
CREAT SERPL-MCNC: 0.85 MG/DL (ref 0.55–1.02)
DIFFERENTIAL METHOD BLD: ABNORMAL
EOSINOPHIL # BLD: 0.1 K/UL (ref 0–0.4)
EOSINOPHIL NFR BLD: 1 % (ref 0–7)
ERYTHROCYTE [DISTWIDTH] IN BLOOD BY AUTOMATED COUNT: 14 % (ref 11.5–14.5)
GLOBULIN SER CALC-MCNC: 3.5 G/DL (ref 2–4)
GLUCOSE SERPL-MCNC: 76 MG/DL (ref 65–100)
HCT VFR BLD AUTO: 32.4 % (ref 35–47)
HGB BLD-MCNC: 10.2 G/DL (ref 11.5–16)
IMM GRANULOCYTES # BLD AUTO: 0.1 K/UL (ref 0–0.04)
IMM GRANULOCYTES NFR BLD AUTO: 1 % (ref 0–0.5)
LYMPHOCYTES # BLD: 1.2 K/UL (ref 0.8–3.5)
LYMPHOCYTES NFR BLD: 7 % (ref 12–49)
MCH RBC QN AUTO: 26.6 PG (ref 26–34)
MCHC RBC AUTO-ENTMCNC: 31.5 G/DL (ref 30–36.5)
MCV RBC AUTO: 84.6 FL (ref 80–99)
MONOCYTES # BLD: 1.7 K/UL (ref 0–1)
MONOCYTES NFR BLD: 10 % (ref 5–13)
NEUTS SEG # BLD: 13.9 K/UL (ref 1.8–8)
NEUTS SEG NFR BLD: 82 % (ref 32–75)
NRBC # BLD: 0 K/UL (ref 0–0.01)
NRBC BLD-RTO: 0 PER 100 WBC
PLATELET # BLD AUTO: 336 K/UL (ref 150–400)
PMV BLD AUTO: 10.1 FL (ref 8.9–12.9)
POTASSIUM SERPL-SCNC: 4.9 MMOL/L (ref 3.5–5.1)
PROT SERPL-MCNC: 6 G/DL (ref 6.4–8.2)
RBC # BLD AUTO: 3.83 M/UL (ref 3.8–5.2)
SERVICE CMNT-IMP: NORMAL
SODIUM SERPL-SCNC: 136 MMOL/L (ref 136–145)
WBC # BLD AUTO: 17 K/UL (ref 3.6–11)

## 2024-07-02 PROCEDURE — 6360000002 HC RX W HCPCS: Performed by: INTERNAL MEDICINE

## 2024-07-02 PROCEDURE — 6370000000 HC RX 637 (ALT 250 FOR IP): Performed by: INTERNAL MEDICINE

## 2024-07-02 PROCEDURE — 2580000003 HC RX 258: Performed by: INTERNAL MEDICINE

## 2024-07-02 PROCEDURE — 85025 COMPLETE CBC W/AUTO DIFF WBC: CPT

## 2024-07-02 PROCEDURE — 94761 N-INVAS EAR/PLS OXIMETRY MLT: CPT

## 2024-07-02 PROCEDURE — 80053 COMPREHEN METABOLIC PANEL: CPT

## 2024-07-02 PROCEDURE — 1100000000 HC RM PRIVATE

## 2024-07-02 PROCEDURE — 36415 COLL VENOUS BLD VENIPUNCTURE: CPT

## 2024-07-02 RX ORDER — SODIUM CHLORIDE 9 MG/ML
INJECTION, SOLUTION INTRAVENOUS CONTINUOUS
Status: DISCONTINUED | OUTPATIENT
Start: 2024-07-02 | End: 2024-07-03

## 2024-07-02 RX ORDER — OXYCODONE HYDROCHLORIDE 5 MG/1
7.5 TABLET ORAL EVERY 6 HOURS PRN
Status: DISCONTINUED | OUTPATIENT
Start: 2024-07-02 | End: 2024-07-04 | Stop reason: HOSPADM

## 2024-07-02 RX ORDER — MORPHINE SULFATE 2 MG/ML
2 INJECTION, SOLUTION INTRAMUSCULAR; INTRAVENOUS EVERY 6 HOURS PRN
Status: DISCONTINUED | OUTPATIENT
Start: 2024-07-02 | End: 2024-07-04 | Stop reason: HOSPADM

## 2024-07-02 RX ADMIN — POTASSIUM CHLORIDE AND SODIUM CHLORIDE: 900; 300 INJECTION, SOLUTION INTRAVENOUS at 03:09

## 2024-07-02 RX ADMIN — OXYCODONE HYDROCHLORIDE 5 MG: 5 TABLET ORAL at 09:30

## 2024-07-02 RX ADMIN — OXYCODONE HYDROCHLORIDE 7.5 MG: 5 TABLET ORAL at 18:45

## 2024-07-02 RX ADMIN — MORPHINE SULFATE 4 MG: 4 INJECTION, SOLUTION INTRAMUSCULAR; INTRAVENOUS at 02:57

## 2024-07-02 RX ADMIN — BUPROPION HYDROCHLORIDE 450 MG: 150 TABLET, EXTENDED RELEASE ORAL at 20:56

## 2024-07-02 RX ADMIN — LITHIUM CARBONATE 300 MG: 300 CAPSULE, GELATIN COATED ORAL at 20:56

## 2024-07-02 RX ADMIN — POTASSIUM CHLORIDE AND SODIUM CHLORIDE: 900; 300 INJECTION, SOLUTION INTRAVENOUS at 13:13

## 2024-07-02 RX ADMIN — SODIUM CHLORIDE, PRESERVATIVE FREE 10 ML: 5 INJECTION INTRAVENOUS at 20:58

## 2024-07-02 RX ADMIN — ACETAMINOPHEN 650 MG: 325 TABLET ORAL at 02:56

## 2024-07-02 RX ADMIN — MORPHINE SULFATE 2 MG: 2 INJECTION, SOLUTION INTRAMUSCULAR; INTRAVENOUS at 13:42

## 2024-07-02 RX ADMIN — WATER 2000 MG: 1 INJECTION INTRAMUSCULAR; INTRAVENOUS; SUBCUTANEOUS at 20:56

## 2024-07-02 RX ADMIN — SODIUM CHLORIDE, PRESERVATIVE FREE 10 ML: 5 INJECTION INTRAVENOUS at 09:33

## 2024-07-02 RX ADMIN — SODIUM CHLORIDE: 9 INJECTION, SOLUTION INTRAVENOUS at 18:01

## 2024-07-02 RX ADMIN — FLUOXETINE HYDROCHLORIDE 40 MG: 20 CAPSULE ORAL at 21:02

## 2024-07-02 RX ADMIN — ARIPIPRAZOLE 2.5 MG: 5 TABLET ORAL at 09:30

## 2024-07-02 RX ADMIN — MORPHINE SULFATE 2 MG: 2 INJECTION, SOLUTION INTRAMUSCULAR; INTRAVENOUS at 20:55

## 2024-07-02 ASSESSMENT — PAIN SCALES - GENERAL
PAINLEVEL_OUTOF10: 5
PAINLEVEL_OUTOF10: 7
PAINLEVEL_OUTOF10: 4
PAINLEVEL_OUTOF10: 6
PAINLEVEL_OUTOF10: 7
PAINLEVEL_OUTOF10: 2
PAINLEVEL_OUTOF10: 7
PAINLEVEL_OUTOF10: 7

## 2024-07-02 ASSESSMENT — PAIN DESCRIPTION - LOCATION
LOCATION: PELVIS;GENERALIZED
LOCATION: GENERALIZED
LOCATION: ABDOMEN

## 2024-07-02 ASSESSMENT — PAIN DESCRIPTION - ORIENTATION: ORIENTATION: LOWER

## 2024-07-02 ASSESSMENT — PAIN DESCRIPTION - DESCRIPTORS
DESCRIPTORS: ACHING
DESCRIPTORS: ACHING

## 2024-07-02 NOTE — CARE COORDINATION
7/2/2024  12:35 PM      ICD-10-CM    1. Pyelonephritis  N12       2. Septicemia (HCC)  A41.9             General Risk Score: 4   Values used to calculate this score:    Points  Metrics       0        Age: 29       1        Hospital Admissions: 1       1        ED Visits: 1       0        Has Chronic Obstructive Pulmonary Disease: No       0        Has Diabetes: No       0        Has Congestive Heart Failure: No       0        Has Liver Disease: No       1        Has Depression: Yes       1        Current PCP: Not on file       0        Has Medicaid: No      CM reviewed EMR. No CM needs indicated at this time. Per attending in IDRs, pt has no CM needs. Providers, if needs arise, please consult case management.        07/01/24 1033   Discharge Planning   Patient expects to be discharged to: House   Services At/After Discharge   Services At/After Discharge None   Mode of Transport at Discharge Other (see comment)   Confirm Follow Up Transport Family

## 2024-07-02 NOTE — PLAN OF CARE
Problem: Discharge Planning  Goal: Discharge to home or other facility with appropriate resources  7/2/2024 0946 by Rocio Conklin LPN  Outcome: HH/HSPC Progressing  7/2/2024 0312 by Betsy Beard RN  Outcome: Progressing  7/2/2024 0311 by Betsy Beard RN  Outcome: Progressing     Problem: Pain  Goal: Verbalizes/displays adequate comfort level or baseline comfort level  7/2/2024 0946 by Rocio Conklin LPN  Outcome: HH/HSPC Progressing  7/2/2024 0312 by Betsy Beard RN  Outcome: Progressing  7/2/2024 0311 by Betsy Beard RN  Outcome: Progressing     Problem: Infection - Adult  Goal: Absence of infection at discharge  7/2/2024 0946 by Rocio Conklin LPN  Outcome: HH/HSPC Progressing  7/2/2024 0312 by Betsy Beard RN  Outcome: Progressing

## 2024-07-02 NOTE — PLAN OF CARE
Problem: Pain  Goal: Verbalizes/displays adequate comfort level or baseline comfort level  Outcome: Progressing     Problem: Discharge Planning  Goal: Discharge to home or other facility with appropriate resources  Outcome: Progressing     Problem: Infection - Adult  Goal: Absence of infection at discharge  Outcome: Progressing

## 2024-07-02 NOTE — PROGRESS NOTES
[facility]NS[facility]  [address] [place] [address] [phone]      Medical Progress Note      NAME: Rosey Hardy   :  1995  MRM:  [id number]    Date/Time of service: 2024         Subjective:     Chief Complaint:  Patient was personally seen and examined by me during this time period.  Chart reviewed.  FU sepsis. Febrile yesterday evening. Improved myalgias. No current abd pain or flank pain.          Objective:       Vitals:       Last 24hrs VS reviewed since prior progress note. Most recent are:    Vitals:    24 1057   BP: 122/86   Pulse: (!) 106   Resp: 25   Temp: 99.1 °F (37.3 °C)   SpO2: 91%     SpO2 Readings from Last 6 Encounters:   24 91%   24 100%        No intake or output data in the 24 hours ending 24 1115     Exam:     Physical Exam:    Gen:  Well-developed, well-nourished, in no acute distress  HEENT:  Pink conjunctivae, PERRL, hearing intact to voice  Resp:  No accessory muscle use, clear breath sounds without wheezes rales or rhonchi  Card:  tachycardia, No murmurs, normal S1, S2, no peripheral edema  Abd:  Soft, non-tender, non-distended, normoactive bowel sounds are present  Musc:  No cyanosis or clubbing  Skin:  No rashes or ulcers, skin turgor is good  Neuro:  Cranial nerves 3-12 are grossly intact, follows commands appropriately  Psych:  Oriented to person, place, and time, Alert with good insight      Medications Reviewed: (see below)    Lab Data Reviewed: (see below)    ______________________________________________________________________    Medications:     Current Facility-Administered Medications   Medication Dose Route Frequency    cefTRIAXone (ROCEPHIN) 2,000 mg in sterile water 20 mL IV syringe  2,000 mg IntraVENous Q24H    sodium chloride flush 0.9 % injection 5-40 mL  5-40 mL IntraVENous 2 times per day    sodium chloride flush 0.9 % injection 5-40 mL  5-40 mL IntraVENous PRN    0.9 % sodium chloride infusion

## 2024-07-03 PROBLEM — N17.9 AKI (ACUTE KIDNEY INJURY) (HCC): Status: ACTIVE | Noted: 2024-07-03

## 2024-07-03 PROBLEM — D72.829 LEUKOCYTOSIS: Status: ACTIVE | Noted: 2024-07-03

## 2024-07-03 PROBLEM — R50.9 FEVER: Status: ACTIVE | Noted: 2024-07-03

## 2024-07-03 PROBLEM — N12 PYELONEPHRITIS: Status: ACTIVE | Noted: 2024-07-03

## 2024-07-03 LAB
ALBUMIN SERPL-MCNC: 2.4 G/DL (ref 3.5–5)
ALBUMIN/GLOB SERPL: 0.6 (ref 1.1–2.2)
ALP SERPL-CCNC: 70 U/L (ref 45–117)
ALT SERPL-CCNC: 16 U/L (ref 12–78)
ANION GAP SERPL CALC-SCNC: 8 MMOL/L (ref 5–15)
AST SERPL-CCNC: 7 U/L (ref 15–37)
BASOPHILS # BLD: 0 K/UL (ref 0–0.1)
BASOPHILS NFR BLD: 0 % (ref 0–1)
BILIRUB SERPL-MCNC: 0.2 MG/DL (ref 0.2–1)
BUN SERPL-MCNC: 4 MG/DL (ref 6–20)
BUN/CREAT SERPL: 5 (ref 12–20)
CALCIUM SERPL-MCNC: 8.9 MG/DL (ref 8.5–10.1)
CHLORIDE SERPL-SCNC: 108 MMOL/L (ref 97–108)
CO2 SERPL-SCNC: 19 MMOL/L (ref 21–32)
COMMENT:: NORMAL
CREAT SERPL-MCNC: 0.81 MG/DL (ref 0.55–1.02)
DIFFERENTIAL METHOD BLD: ABNORMAL
EOSINOPHIL # BLD: 0.1 K/UL (ref 0–0.4)
EOSINOPHIL NFR BLD: 1 % (ref 0–7)
ERYTHROCYTE [DISTWIDTH] IN BLOOD BY AUTOMATED COUNT: 14.3 % (ref 11.5–14.5)
GLOBULIN SER CALC-MCNC: 3.7 G/DL (ref 2–4)
GLUCOSE SERPL-MCNC: 80 MG/DL (ref 65–100)
HCT VFR BLD AUTO: 30.9 % (ref 35–47)
HGB BLD-MCNC: 10 G/DL (ref 11.5–16)
IMM GRANULOCYTES # BLD AUTO: 0.1 K/UL (ref 0–0.04)
IMM GRANULOCYTES NFR BLD AUTO: 1 % (ref 0–0.5)
LYMPHOCYTES # BLD: 1.5 K/UL (ref 0.8–3.5)
LYMPHOCYTES NFR BLD: 12 % (ref 12–49)
MCH RBC QN AUTO: 26.8 PG (ref 26–34)
MCHC RBC AUTO-ENTMCNC: 32.4 G/DL (ref 30–36.5)
MCV RBC AUTO: 82.8 FL (ref 80–99)
MONOCYTES # BLD: 1.3 K/UL (ref 0–1)
MONOCYTES NFR BLD: 11 % (ref 5–13)
NEUTS SEG # BLD: 9.2 K/UL (ref 1.8–8)
NEUTS SEG NFR BLD: 75 % (ref 32–75)
NRBC # BLD: 0 K/UL (ref 0–0.01)
NRBC BLD-RTO: 0 PER 100 WBC
PLATELET # BLD AUTO: 377 K/UL (ref 150–400)
PMV BLD AUTO: 10.2 FL (ref 8.9–12.9)
POTASSIUM SERPL-SCNC: 4.3 MMOL/L (ref 3.5–5.1)
PROT SERPL-MCNC: 6.1 G/DL (ref 6.4–8.2)
RBC # BLD AUTO: 3.73 M/UL (ref 3.8–5.2)
SODIUM SERPL-SCNC: 135 MMOL/L (ref 136–145)
SPECIMEN HOLD: NORMAL
WBC # BLD AUTO: 12.3 K/UL (ref 3.6–11)

## 2024-07-03 PROCEDURE — 94761 N-INVAS EAR/PLS OXIMETRY MLT: CPT

## 2024-07-03 PROCEDURE — 6370000000 HC RX 637 (ALT 250 FOR IP): Performed by: INTERNAL MEDICINE

## 2024-07-03 PROCEDURE — 6360000002 HC RX W HCPCS: Performed by: INTERNAL MEDICINE

## 2024-07-03 PROCEDURE — 2580000003 HC RX 258: Performed by: INTERNAL MEDICINE

## 2024-07-03 PROCEDURE — 99223 1ST HOSP IP/OBS HIGH 75: CPT | Performed by: INTERNAL MEDICINE

## 2024-07-03 PROCEDURE — 80053 COMPREHEN METABOLIC PANEL: CPT

## 2024-07-03 PROCEDURE — 1100000000 HC RM PRIVATE

## 2024-07-03 PROCEDURE — 85025 COMPLETE CBC W/AUTO DIFF WBC: CPT

## 2024-07-03 RX ORDER — SODIUM CHLORIDE 450 MG/100ML
INJECTION, SOLUTION INTRAVENOUS CONTINUOUS
Status: DISCONTINUED | OUTPATIENT
Start: 2024-07-03 | End: 2024-07-04

## 2024-07-03 RX ADMIN — ACETAMINOPHEN 650 MG: 325 TABLET ORAL at 20:08

## 2024-07-03 RX ADMIN — SODIUM CHLORIDE: 4.5 INJECTION, SOLUTION INTRAVENOUS at 11:28

## 2024-07-03 RX ADMIN — ARIPIPRAZOLE 2.5 MG: 5 TABLET ORAL at 08:18

## 2024-07-03 RX ADMIN — MORPHINE SULFATE 2 MG: 2 INJECTION, SOLUTION INTRAMUSCULAR; INTRAVENOUS at 06:55

## 2024-07-03 RX ADMIN — LITHIUM CARBONATE 300 MG: 300 CAPSULE, GELATIN COATED ORAL at 20:07

## 2024-07-03 RX ADMIN — BUPROPION HYDROCHLORIDE 450 MG: 150 TABLET, EXTENDED RELEASE ORAL at 20:07

## 2024-07-03 RX ADMIN — WATER 2000 MG: 1 INJECTION INTRAMUSCULAR; INTRAVENOUS; SUBCUTANEOUS at 20:08

## 2024-07-03 RX ADMIN — OXYCODONE HYDROCHLORIDE 7.5 MG: 5 TABLET ORAL at 17:37

## 2024-07-03 RX ADMIN — FLUOXETINE HYDROCHLORIDE 40 MG: 20 CAPSULE ORAL at 20:07

## 2024-07-03 ASSESSMENT — PAIN SCALES - GENERAL
PAINLEVEL_OUTOF10: 7
PAINLEVEL_OUTOF10: 5
PAINLEVEL_OUTOF10: 3
PAINLEVEL_OUTOF10: 5

## 2024-07-03 ASSESSMENT — PAIN DESCRIPTION - ORIENTATION
ORIENTATION: RIGHT
ORIENTATION: RIGHT;LEFT

## 2024-07-03 ASSESSMENT — PAIN DESCRIPTION - LOCATION
LOCATION: PELVIS
LOCATION: FLANK
LOCATION: PELVIS

## 2024-07-03 ASSESSMENT — PAIN DESCRIPTION - DESCRIPTORS
DESCRIPTORS: ACHING
DESCRIPTORS: ACHING;SORE
DESCRIPTORS: ACHING

## 2024-07-03 NOTE — CONSULTS
Infectious Disease Consult    Impression/Plan   Bilateral acute pyelonephritis left greater then right with bilateral ureteritis.  Patient received Macrodantin and Cipro prior to admission.  Urine cultures at this admission are sterile to date.  Blood cultures are sterile as well.  She is improving on ceftriaxone.  Follow blood cultures.  As long as blood cultures remain sterile patient may be discharged on cefixime 400 mg daily x 10 days   Leukocytosis.  Due to above.  Follow trend  TESFAYE.  Resolved  Anxiety and depression.  Patient on Abilify, Wellbutrin, Prozac, and lithium    Anti-infectives:   Ceftriaxone    Subjective:   Date of Consultation:  July 3, 2024  Date of Admission: 6/30/2024   Referring Physician:     Patient is a 29 y.o. female with a past medical history significant for anxiety with depression who is being seen for pyelonephritis.  Patient was in her usual state of health up until 6/29/2024 when she developed urinary frequency and dysuria.  She was seen the next day in the emergency department and diagnosed with a UTI.  She was started on Macrodantin.  She took 1 dose and subsequently developed fever, chills, and bodyaches.  Her brother who is a radiologist changed the antibiotics to ciprofloxacin.  She took 2 doses of this antibiotic with no improvement and subsequently presented to the emergency department for further evaluation.  Workup at the time of admission revealed patient to be afebrile however her white count was 19,000.  She has been started on ceftriaxone.  Blood and urine cultures are negative to date.  Patient feeling much better at the time of examination.  The infectious diseases service has been asked to assist with antibiotic management    Patient Active Problem List   Diagnosis    Acute pyelonephritis    Hypokalemia    Hyponatremia    Sepsis (HCC)    Anxiety with depression     No past medical history on file.   No family history on file.   Social History     Tobacco Use     Review:   Recent Results (from the past 24 hour(s))   CBC with Auto Differential    Collection Time: 07/03/24  7:20 AM   Result Value Ref Range    WBC 12.3 (H) 3.6 - 11.0 K/uL    RBC 3.73 (L) 3.80 - 5.20 M/uL    Hemoglobin 10.0 (L) 11.5 - 16.0 g/dL    Hematocrit 30.9 (L) 35.0 - 47.0 %    MCV 82.8 80.0 - 99.0 FL    MCH 26.8 26.0 - 34.0 PG    MCHC 32.4 30.0 - 36.5 g/dL    RDW 14.3 11.5 - 14.5 %    Platelets 377 150 - 400 K/uL    MPV 10.2 8.9 - 12.9 FL    Nucleated RBCs 0.0 0  WBC    nRBC 0.00 0.00 - 0.01 K/uL    Neutrophils % 75 32 - 75 %    Lymphocytes % 12 12 - 49 %    Monocytes % 11 5 - 13 %    Eosinophils % 1 0 - 7 %    Basophils % 0 0 - 1 %    Immature Granulocytes % 1 (H) 0.0 - 0.5 %    Neutrophils Absolute 9.2 (H) 1.8 - 8.0 K/UL    Lymphocytes Absolute 1.5 0.8 - 3.5 K/UL    Monocytes Absolute 1.3 (H) 0.0 - 1.0 K/UL    Eosinophils Absolute 0.1 0.0 - 0.4 K/UL    Basophils Absolute 0.0 0.0 - 0.1 K/UL    Immature Granulocytes Absolute 0.1 (H) 0.00 - 0.04 K/UL    Differential Type AUTOMATED     Comprehensive Metabolic Panel w/ Reflex to MG    Collection Time: 07/03/24  7:20 AM   Result Value Ref Range    Sodium 135 (L) 136 - 145 mmol/L    Potassium 4.3 3.5 - 5.1 mmol/L    Chloride 108 97 - 108 mmol/L    CO2 19 (L) 21 - 32 mmol/L    Anion Gap 8 5 - 15 mmol/L    Glucose 80 65 - 100 mg/dL    BUN 4 (L) 6 - 20 MG/DL    Creatinine 0.81 0.55 - 1.02 MG/DL    BUN/Creatinine Ratio 5 (L) 12 - 20      Est, Glom Filt Rate >90 >60 ml/min/1.73m2    Calcium 8.9 8.5 - 10.1 MG/DL    Total Bilirubin 0.2 0.2 - 1.0 MG/DL    ALT 16 12 - 78 U/L    AST 7 (L) 15 - 37 U/L    Alk Phosphatase 70 45 - 117 U/L    Total Protein 6.1 (L) 6.4 - 8.2 g/dL    Albumin 2.4 (L) 3.5 - 5.0 g/dL    Globulin 3.7 2.0 - 4.0 g/dL    Albumin/Globulin Ratio 0.6 (L) 1.1 - 2.2     Extra Tubes Hold    Collection Time: 07/03/24  7:20 AM   Result Value Ref Range    Specimen HOld 1LAV     Comment:        Add-on orders for these samples will be processed based on

## 2024-07-03 NOTE — PROGRESS NOTES
[facility]  [address] [place] [address] [phone]      Medical Progress Note      NAME: Rosey Hardy   :  1995  MRM:  [id number]    Date/Time of service: 7/3/2024         Subjective:     Chief Complaint:  Patient was personally seen and examined by me during this time period.  Chart reviewed.  FU sepsis. HR improved. Improved myalgias but still with fatigue and poor oral intake. Discussed with  at bedside          Objective:       Vitals:       Last 24hrs VS reviewed since prior progress note. Most recent are:    Vitals:    24 0759   BP: 116/79   Pulse: 85   Resp: 18   Temp: 98.6 °F (37 °C)   SpO2: 100%     SpO2 Readings from Last 6 Encounters:   24 100%   24 100%        No intake or output data in the 24 hours ending 24 1010     Exam:     Physical Exam:    Gen:  Well-developed, well-nourished, in no acute distress  HEENT:  Pink conjunctivae, PERRL, hearing intact to voice  Resp:  No accessory muscle use, clear breath sounds without wheezes rales or rhonchi  Card:  tachycardia, No murmurs, normal S1, S2, no peripheral edema  Abd:  Soft, non-tender, non-distended, normoactive bowel sounds are present  Musc:  No cyanosis or clubbing  Skin:  No rashes or ulcers, skin turgor is good  Neuro:  Cranial nerves 3-12 are grossly intact, follows commands appropriately  Psych:  Oriented to person, place, and time, Alert with good insight      Medications Reviewed: (see below)    Lab Data Reviewed: (see below)    ______________________________________________________________________    Medications:     Current Facility-Administered Medications   Medication Dose Route Frequency    0.45 % sodium chloride infusion   IntraVENous Continuous    morphine (PF) injection 2 mg  2 mg IntraVENous Q6H PRN    oxyCODONE (ROXICODONE) immediate release tablet 7.5 mg  7.5 mg Oral Q6H PRN    cefTRIAXone (ROCEPHIN) 2,000 mg in sterile water 20 mL IV syringe   2,000 mg IntraVENous Q24H    sodium chloride flush 0.9 % injection 5-40 mL  5-40 mL IntraVENous 2 times per day    sodium chloride flush 0.9 % injection 5-40 mL  5-40 mL IntraVENous PRN    0.9 % sodium chloride infusion   IntraVENous PRN    magnesium sulfate 2000 mg in 50 mL IVPB premix  2,000 mg IntraVENous PRN    ondansetron (ZOFRAN-ODT) disintegrating tablet 4 mg  4 mg Oral Q8H PRN    Or    ondansetron (ZOFRAN) injection 4 mg  4 mg IntraVENous Q6H PRN    polyethylene glycol (GLYCOLAX) packet 17 g  17 g Oral Daily PRN    acetaminophen (TYLENOL) tablet 650 mg  650 mg Oral Q6H PRN    Or    acetaminophen (TYLENOL) suppository 650 mg  650 mg Rectal Q6H PRN    ARIPiprazole (ABILIFY) tablet 2.5 mg  2.5 mg Oral QAM    buPROPion (WELLBUTRIN XL) extended release tablet 450 mg  450 mg Oral Nightly    FLUoxetine (PROZAC) capsule 40 mg  40 mg Oral Nightly    lithium capsule 300 mg  300 mg Oral Nightly    enoxaparin (LOVENOX) injection 40 mg  40 mg SubCUTAneous Daily          Lab Review:     Recent Labs     06/30/24  2203 07/02/24  1110 07/03/24  0720   WBC 19.5* 17.0* 12.3*   HGB 10.6* 10.2* 10.0*   HCT 31.0* 32.4* 30.9*    336 377       Recent Labs     06/30/24  2203 07/01/24  0945 07/02/24  1110 07/03/24  0720   * 135* 136 135*   K 3.2* 3.3* 4.9 4.3   CL 99 107 110* 108   CO2 22 22 17* 19*   BUN 9 8 5* 4*   MG  --  2.1  --   --    ALT 19  --  15 16       No results found for: \"GLUCPOC\"       Assessment / Plan:     Sepsis due to bilateral acute pyelonephritis and bilateral ureteritis POA: failed outpatient treatments. No obstruction on CT scan. Lactic acid normal. Blood cultures NTD.  urine culture neg. Continue IV Ceftriaxone; avoiding flouroquinolones due to qtcs. Reduce IVF. IV morphine PRN severe pain. Consult ID to weigh in on abx     Hypokalemia / Hyponatremia POA: resolved. likely from poor intake. Follow Mg. Replenish prn      Anxiety with depression POA:continue home Prozac, Bupropion, Abilify and

## 2024-07-03 NOTE — PLAN OF CARE
Problem: Discharge Planning  Goal: Discharge to home or other facility with appropriate resources  7/3/2024 0927 by Gary Sharp, RN  Outcome: Progressing  7/2/2024 2306 by Betsy Beard RN  Outcome: Progressing     Problem: Pain  Goal: Verbalizes/displays adequate comfort level or baseline comfort level  7/3/2024 0927 by Gary Sharp, RN  Outcome: Progressing  7/2/2024 2306 by Betsy Beard, RN  Outcome: Progressing     Problem: Infection - Adult  Goal: Absence of infection at discharge  7/3/2024 0927 by Gary Sharp, RN  Outcome: Progressing  7/2/2024 2306 by Betsy Beard, RN  Outcome: Progressing

## 2024-07-03 NOTE — PLAN OF CARE
Problem: Pain  Goal: Verbalizes/displays adequate comfort level or baseline comfort level  7/2/2024 2306 by Betsy Beard RN  Outcome: Progressing  7/2/2024 0946 by Rocio Conklin LPN  Outcome: HH/HSPC Progressing     Problem: Infection - Adult  Goal: Absence of infection at discharge  7/2/2024 2306 by Betsy Beard RN  Outcome: Progressing  7/2/2024 0946 by Rocio Conklin LPN  Outcome: HH/HSPC Progressing     Problem: Discharge Planning  Goal: Discharge to home or other facility with appropriate resources  7/2/2024 2306 by Betsy Beard RN  Outcome: Progressing  7/2/2024 0946 by Rocio Conklin LPN  Outcome: HH/HSPC Progressing

## 2024-07-03 NOTE — CARE COORDINATION
7/3/2024  7:31 AM      ICD-10-CM    1. Pyelonephritis  N12       2. Septicemia (HCC)  A41.9             General Risk Score: 4   Values used to calculate this score:    Points  Metrics       0        Age: 29       1        Hospital Admissions: 1       1        ED Visits: 1       0        Has Chronic Obstructive Pulmonary Disease: No       0        Has Diabetes: No       0        Has Congestive Heart Failure: No       0        Has Liver Disease: No       1        Has Depression: Yes       1        Current PCP: Not on file       0        Has Medicaid: No      CM reviewed EMR. No CM needs indicated at this time. Per attending in IDRs, pt has no CM needs. Providers, if needs arise, please consult case management.        07/01/24 1033   Discharge Planning   Patient expects to be discharged to: House   Services At/After Discharge   Services At/After Discharge None   Mode of Transport at Discharge Other (see comment)   Confirm Follow Up Transport Family

## 2024-07-04 VITALS
BODY MASS INDEX: 33.3 KG/M2 | OXYGEN SATURATION: 96 % | WEIGHT: 176.37 LBS | HEART RATE: 82 BPM | HEIGHT: 61 IN | RESPIRATION RATE: 16 BRPM | DIASTOLIC BLOOD PRESSURE: 86 MMHG | TEMPERATURE: 98.1 F | SYSTOLIC BLOOD PRESSURE: 128 MMHG

## 2024-07-04 LAB
ANION GAP SERPL CALC-SCNC: 7 MMOL/L (ref 5–15)
BASOPHILS # BLD: 0.1 K/UL (ref 0–0.1)
BASOPHILS NFR BLD: 1 % (ref 0–1)
BUN SERPL-MCNC: 5 MG/DL (ref 6–20)
BUN/CREAT SERPL: 6 (ref 12–20)
CALCIUM SERPL-MCNC: 9.7 MG/DL (ref 8.5–10.1)
CHLORIDE SERPL-SCNC: 110 MMOL/L (ref 97–108)
CO2 SERPL-SCNC: 23 MMOL/L (ref 21–32)
CREAT SERPL-MCNC: 0.89 MG/DL (ref 0.55–1.02)
DIFFERENTIAL METHOD BLD: ABNORMAL
EOSINOPHIL # BLD: 0.1 K/UL (ref 0–0.4)
EOSINOPHIL NFR BLD: 1 % (ref 0–7)
ERYTHROCYTE [DISTWIDTH] IN BLOOD BY AUTOMATED COUNT: 14.3 % (ref 11.5–14.5)
GLUCOSE SERPL-MCNC: 89 MG/DL (ref 65–100)
HCT VFR BLD AUTO: 34.2 % (ref 35–47)
HGB BLD-MCNC: 10.9 G/DL (ref 11.5–16)
IMM GRANULOCYTES # BLD AUTO: 0.1 K/UL (ref 0–0.04)
IMM GRANULOCYTES NFR BLD AUTO: 1 % (ref 0–0.5)
LYMPHOCYTES # BLD: 1.6 K/UL (ref 0.8–3.5)
LYMPHOCYTES NFR BLD: 18 % (ref 12–49)
MCH RBC QN AUTO: 26.6 PG (ref 26–34)
MCHC RBC AUTO-ENTMCNC: 31.9 G/DL (ref 30–36.5)
MCV RBC AUTO: 83.4 FL (ref 80–99)
MONOCYTES # BLD: 1.1 K/UL (ref 0–1)
MONOCYTES NFR BLD: 12 % (ref 5–13)
NEUTS SEG # BLD: 6.1 K/UL (ref 1.8–8)
NEUTS SEG NFR BLD: 67 % (ref 32–75)
NRBC # BLD: 0 K/UL (ref 0–0.01)
NRBC BLD-RTO: 0 PER 100 WBC
PLATELET # BLD AUTO: 468 K/UL (ref 150–400)
PMV BLD AUTO: 9.8 FL (ref 8.9–12.9)
POTASSIUM SERPL-SCNC: 3.6 MMOL/L (ref 3.5–5.1)
RBC # BLD AUTO: 4.1 M/UL (ref 3.8–5.2)
SODIUM SERPL-SCNC: 140 MMOL/L (ref 136–145)
WBC # BLD AUTO: 9.1 K/UL (ref 3.6–11)

## 2024-07-04 PROCEDURE — 80048 BASIC METABOLIC PNL TOTAL CA: CPT

## 2024-07-04 PROCEDURE — 6370000000 HC RX 637 (ALT 250 FOR IP): Performed by: INTERNAL MEDICINE

## 2024-07-04 PROCEDURE — 36415 COLL VENOUS BLD VENIPUNCTURE: CPT

## 2024-07-04 PROCEDURE — 85025 COMPLETE CBC W/AUTO DIFF WBC: CPT

## 2024-07-04 RX ORDER — LACTOBACILLUS RHAMNOSUS GG 10B CELL
1 CAPSULE ORAL
Qty: 30 CAPSULE | Refills: 0 | Status: SHIPPED | OUTPATIENT
Start: 2024-07-05

## 2024-07-04 RX ORDER — LACTOBACILLUS RHAMNOSUS GG 10B CELL
1 CAPSULE ORAL
Status: DISCONTINUED | OUTPATIENT
Start: 2024-07-05 | End: 2024-07-04 | Stop reason: HOSPADM

## 2024-07-04 RX ORDER — CEFIXIME 400 MG/1
400 CAPSULE ORAL DAILY
Qty: 10 CAPSULE | Refills: 0 | Status: SHIPPED | OUTPATIENT
Start: 2024-07-04 | End: 2024-07-14

## 2024-07-04 RX ADMIN — ACETAMINOPHEN 650 MG: 325 TABLET ORAL at 06:07

## 2024-07-04 RX ADMIN — ARIPIPRAZOLE 2.5 MG: 5 TABLET ORAL at 08:57

## 2024-07-04 ASSESSMENT — PAIN SCALES - GENERAL: PAINLEVEL_OUTOF10: 5

## 2024-07-04 ASSESSMENT — PAIN DESCRIPTION - LOCATION: LOCATION: PELVIS

## 2024-07-04 NOTE — PLAN OF CARE
Problem: Discharge Planning  Goal: Discharge to home or other facility with appropriate resources  7/4/2024 1135 by Gary Sharp RN  Outcome: Progressing  7/4/2024 0903 by Gary Sharp RN  Outcome: Progressing  7/4/2024 0318 by Betsy Beard RN  Outcome: Progressing     Problem: Pain  Goal: Verbalizes/displays adequate comfort level or baseline comfort level  7/4/2024 1135 by Gary Sharp RN  Outcome: Progressing  7/4/2024 0903 by Gary Sharp RN  Outcome: Progressing  7/4/2024 0318 by Betsy Beard RN  Outcome: Progressing     Problem: Infection - Adult  Goal: Absence of infection at discharge  7/4/2024 1135 by Gary Sharp RN  Outcome: Progressing  7/4/2024 0903 by Gary Sharp RN  Outcome: Progressing  7/4/2024 0318 by Betsy Beard RN  Outcome: Progressing

## 2024-07-04 NOTE — PROGRESS NOTES
Patient was handed a copy of discharge instructions which were read and explained to patient. New medications read and explained, patient verbalized understanding. Patient aware that medications have been sent electronically to pharmacy of choice. All questions and concerns were addressed, IVs were removed, VS stable and patient was sent with all belongings

## 2024-07-04 NOTE — PLAN OF CARE
Problem: Discharge Planning  Goal: Discharge to home or other facility with appropriate resources  7/4/2024 0903 by Gary Sharp, RN  Outcome: Progressing  7/4/2024 0318 by Betsy Beard RN  Outcome: Progressing     Problem: Pain  Goal: Verbalizes/displays adequate comfort level or baseline comfort level  7/4/2024 0903 by Gary Sharp, RN  Outcome: Progressing  7/4/2024 0318 by Betsy Beard, RN  Outcome: Progressing     Problem: Infection - Adult  Goal: Absence of infection at discharge  7/4/2024 0903 by Gary Sharp, RN  Outcome: Progressing  7/4/2024 0318 by Betsy Beard, RN  Outcome: Progressing

## 2024-07-04 NOTE — DISCHARGE SUMMARY
[facility]  [address] [place], [place] [id number] [phone]          Hospitalist Discharge Summary     Patient ID:  Rosey Hardy  [id number]  29 y.o.  1995    Admit date: 6/30/2024    Discharge date and time: 7/4/2024 8:41 AM    Admission Diagnoses: Pyelonephritis [N12]  Septicemia (HCC) [A41.9]  Acute pyelonephritis [N10]    Discharge Diagnoses:  Principal Diagnosis Acute pyelonephritis                                            Principal Problem:    Acute pyelonephritis  Active Problems:    Hypokalemia    Hyponatremia    Sepsis (HCC)    Anxiety with depression    Leukocytosis    Fever    TESFAYE (acute kidney injury) (HCC)    Pyelonephritis  Resolved Problems:    * No resolved hospital problems. *         Hospital Course:     Sepsis due to bilateral acute pyelonephritis and bilateral ureteritis POA: failed outpatient treatments. No obstruction on CT scan. Lactic acid normal. Blood cultures NTD.  urine culture neg. Sp IV Ceftriaxone and cefixime on DC to complete treatment course. ID evaluated. Fu OP     Hypokalemia / Hyponatremia POA: resolved. likely from poor intake. Fu Op      Anxiety with depression POA:continue home Prozac, Bupropion, Abilify and lithium. FU OP     PCP: No primary care provider on file.     Consults: ID    Significant Diagnostic Studies:     Ct abd pelvis   IMPRESSION:     1. Bilateral kidney acute pyelonephritis, left worse than right, and bilateral  ureteritis.     2. No hydronephrosis.    Discharge Exam:  Physical Exam:    Gen:  Well-developed, well-nourished, in no acute distress  HEENT:  Pink conjunctivae, PERRL, hearing intact to voice  Resp:  No accessory muscle use, clear breath sounds without wheezes rales or rhonchi  Card:  tachycardia, No murmurs, normal S1, S2, no peripheral edema  Abd:  Soft, non-tender, non-distended, normoactive bowel sounds are present  Musc:  No cyanosis or clubbing  Skin:  No rashes or ulcers, skin turgor is

## 2024-07-04 NOTE — DISCHARGE INSTRUCTIONS
HOSPITALIST DISCHARGE INSTRUCTIONS  NAME: Rosey Hardy   :  1995   MRN:  [id number]     Date/Time:  2024 8:40 AM    ADMIT DATE: 2024     DISCHARGE DATE: 2024     ADMITTING DIAGNOSIS:  Pyelonephritis     DISCHARGE DIAGNOSIS:  Same as above          Kidney Infection: Care Instructions  Your Care Instructions     A kidney infection (pyelonephritis) is a type of urinary tract infection, or UTI. Most UTIs are bladder infections. Kidney infections tend to make people much sicker than bladder infections do. A kidney infection is also more serious because it can cause lasting damage if it is not treated quickly.  Follow-up care is a key part of your treatment and safety. Be sure to make and go to all appointments, and call your doctor if you are having problems. It's also a good idea to know your test results and keep a list of the medicines you take.  How can you care for yourself at home?  Take your antibiotics as directed. Do not stop taking them just because you feel better. You need to take the full course of antibiotics.  Drink plenty of water. This may help wash out bacteria that are causing the infection. If you have kidney, heart, or liver disease and have to limit fluids, talk with your doctor before you increase the amount of fluids you drink.  Urinate often. Try to empty your bladder each time.  To relieve pain, take a hot shower or lay a heating pad (set on low) over your lower belly. Never go to sleep with a heating pad in place. Put a thin cloth between the heating pad and your skin.  To help prevent kidney infections  Drink plenty of water each day. This helps you urinate often, which clears bacteria from your system. If you have kidney, heart, or liver disease and have to limit fluids, talk with your doctor before you increase the amount of fluids you drink.  Urinate when you have the urge. Do not hold your urine for a long time. Urinate before you go to sleep.  If you have symptoms  of a bladder infection, such as burning when you urinate or having to urinate often, call your doctor so you can treat the problem before it gets worse. If you do not treat a bladder infection quickly, it can spread to the kidney.  Men should keep the tip of the penis clean.  If you are a woman, keep these ideas in mind:  Urinate right after you have sex.  Change sanitary pads often. Avoid douches, feminine hygiene sprays, and other feminine hygiene products that have deodorants.  After going to the bathroom, wipe from front to back.  When should you call for help?   Call your doctor now or seek immediate medical care if:    You have symptoms that a kidney infection is getting worse. These may include:  Pain or burning when you urinate.  A frequent need to urinate without being able to pass much urine.  Pain in the flank, which is just below the rib cage and above the waist on either side of the back.  Blood in the urine.  A fever.     You are vomiting or nauseated.   Watch closely for changes in your health, and be sure to contact your doctor if:    You do not get better as expected.   Where can you learn more?  Go to https://www.[employer].net/patientEd and enter E877 to learn more about \"Kidney Infection: Care Instructions.\"  Current as of: November 15, 2023  Content Version: 14.1  © 5808-1028 [employer].   Care instructions adapted under license by [employer]. If you have questions about a medical condition or this instruction, always ask your healthcare professional. [employer] disclaims any warranty or liability for your use of this information.        MEDICATIONS:    It is important that you take the medication exactly as they are prescribed.   Keep your medication in the bottles provided by the pharmacist and keep a list of the medication names, dosages, and times to be taken in your wallet.   Do not take other medications without consulting your doctor     Pain Management: per

## 2024-07-07 LAB
BACTERIA SPEC CULT: NORMAL
BACTERIA SPEC CULT: NORMAL
SERVICE CMNT-IMP: NORMAL
SERVICE CMNT-IMP: NORMAL